# Patient Record
Sex: FEMALE | Race: WHITE | NOT HISPANIC OR LATINO | Employment: FULL TIME | URBAN - METROPOLITAN AREA
[De-identification: names, ages, dates, MRNs, and addresses within clinical notes are randomized per-mention and may not be internally consistent; named-entity substitution may affect disease eponyms.]

---

## 2021-08-25 ENCOUNTER — ANCILLARY PROCEDURE (OUTPATIENT)
Dept: GENERAL RADIOLOGY | Facility: CLINIC | Age: 21
End: 2021-08-25
Attending: PHYSICIAN ASSISTANT
Payer: COMMERCIAL

## 2021-08-25 ENCOUNTER — OFFICE VISIT (OUTPATIENT)
Dept: URGENT CARE | Facility: URGENT CARE | Age: 21
End: 2021-08-25
Payer: COMMERCIAL

## 2021-08-25 VITALS
OXYGEN SATURATION: 100 % | BODY MASS INDEX: 28.04 KG/M2 | RESPIRATION RATE: 16 BRPM | TEMPERATURE: 98.2 F | HEART RATE: 82 BPM | WEIGHT: 185 LBS | DIASTOLIC BLOOD PRESSURE: 71 MMHG | HEIGHT: 68 IN | SYSTOLIC BLOOD PRESSURE: 124 MMHG

## 2021-08-25 DIAGNOSIS — N76.0 BACTERIAL VAGINOSIS: Primary | ICD-10-CM

## 2021-08-25 DIAGNOSIS — R10.31 RIGHT LOWER QUADRANT PAIN: ICD-10-CM

## 2021-08-25 DIAGNOSIS — B96.89 BACTERIAL VAGINOSIS: Primary | ICD-10-CM

## 2021-08-25 DIAGNOSIS — R52 PAIN: ICD-10-CM

## 2021-08-25 LAB
ALBUMIN UR-MCNC: NEGATIVE MG/DL
ANION GAP SERPL CALCULATED.3IONS-SCNC: 2 MMOL/L (ref 3–14)
APPEARANCE UR: CLEAR
BACTERIA #/AREA URNS HPF: ABNORMAL /HPF
BASOPHILS # BLD AUTO: 0 10E3/UL (ref 0–0.2)
BASOPHILS NFR BLD AUTO: 0 %
BILIRUB UR QL STRIP: NEGATIVE
BUN SERPL-MCNC: 8 MG/DL (ref 7–30)
CALCIUM SERPL-MCNC: 8.7 MG/DL (ref 8.5–10.1)
CHLORIDE BLD-SCNC: 106 MMOL/L (ref 94–109)
CLUE CELLS: PRESENT
CO2 SERPL-SCNC: 28 MMOL/L (ref 20–32)
COLOR UR AUTO: YELLOW
CREAT SERPL-MCNC: 0.76 MG/DL (ref 0.52–1.04)
EOSINOPHIL # BLD AUTO: 0 10E3/UL (ref 0–0.7)
EOSINOPHIL NFR BLD AUTO: 0 %
ERYTHROCYTE [DISTWIDTH] IN BLOOD BY AUTOMATED COUNT: 12.1 % (ref 10–15)
GFR SERPL CREATININE-BSD FRML MDRD: >90 ML/MIN/1.73M2
GLUCOSE BLD-MCNC: 98 MG/DL (ref 70–99)
GLUCOSE UR STRIP-MCNC: NEGATIVE MG/DL
HCT VFR BLD AUTO: 37 % (ref 35–47)
HGB BLD-MCNC: 12.3 G/DL (ref 11.7–15.7)
HGB UR QL STRIP: ABNORMAL
KETONES UR STRIP-MCNC: NEGATIVE MG/DL
LEUKOCYTE ESTERASE UR QL STRIP: NEGATIVE
LYMPHOCYTES # BLD AUTO: 1.2 10E3/UL (ref 0.8–5.3)
LYMPHOCYTES NFR BLD AUTO: 9 %
MCH RBC QN AUTO: 29.9 PG (ref 26.5–33)
MCHC RBC AUTO-ENTMCNC: 33.2 G/DL (ref 31.5–36.5)
MCV RBC AUTO: 90 FL (ref 78–100)
MONOCYTES # BLD AUTO: 0.5 10E3/UL (ref 0–1.3)
MONOCYTES NFR BLD AUTO: 4 %
NEUTROPHILS # BLD AUTO: 11.5 10E3/UL (ref 1.6–8.3)
NEUTROPHILS NFR BLD AUTO: 87 %
NITRATE UR QL: NEGATIVE
PH UR STRIP: 6.5 [PH] (ref 5–7)
PLATELET # BLD AUTO: 273 10E3/UL (ref 150–450)
POTASSIUM BLD-SCNC: 3.4 MMOL/L (ref 3.4–5.3)
RBC # BLD AUTO: 4.11 10E6/UL (ref 3.8–5.2)
RBC #/AREA URNS AUTO: ABNORMAL /HPF
SODIUM SERPL-SCNC: 136 MMOL/L (ref 133–144)
SP GR UR STRIP: 1.02 (ref 1–1.03)
SQUAMOUS #/AREA URNS AUTO: ABNORMAL /LPF
TRICHOMONAS, WET PREP: ABNORMAL
UROBILINOGEN UR STRIP-ACNC: 0.2 E.U./DL
WBC # BLD AUTO: 13.2 10E3/UL (ref 4–11)
WBC #/AREA URNS AUTO: ABNORMAL /HPF
WBC'S/HIGH POWER FIELD, WET PREP: ABNORMAL
YEAST, WET PREP: ABNORMAL

## 2021-08-25 PROCEDURE — 99204 OFFICE O/P NEW MOD 45 MIN: CPT | Performed by: PHYSICIAN ASSISTANT

## 2021-08-25 PROCEDURE — 36415 COLL VENOUS BLD VENIPUNCTURE: CPT | Performed by: PHYSICIAN ASSISTANT

## 2021-08-25 PROCEDURE — 85025 COMPLETE CBC W/AUTO DIFF WBC: CPT | Performed by: PHYSICIAN ASSISTANT

## 2021-08-25 PROCEDURE — 80048 BASIC METABOLIC PNL TOTAL CA: CPT | Performed by: PHYSICIAN ASSISTANT

## 2021-08-25 PROCEDURE — 74019 RADEX ABDOMEN 2 VIEWS: CPT | Performed by: RADIOLOGY

## 2021-08-25 PROCEDURE — 87210 SMEAR WET MOUNT SALINE/INK: CPT | Performed by: PHYSICIAN ASSISTANT

## 2021-08-25 PROCEDURE — 81001 URINALYSIS AUTO W/SCOPE: CPT | Performed by: PHYSICIAN ASSISTANT

## 2021-08-25 RX ORDER — TRAMADOL HYDROCHLORIDE 50 MG/1
50 TABLET ORAL EVERY 6 HOURS PRN
Qty: 10 TABLET | Refills: 0 | Status: CANCELLED | OUTPATIENT
Start: 2021-08-25 | End: 2021-08-28

## 2021-08-25 RX ORDER — METRONIDAZOLE 500 MG/1
500 TABLET ORAL 2 TIMES DAILY
Qty: 14 TABLET | Refills: 0 | Status: SHIPPED | OUTPATIENT
Start: 2021-08-25 | End: 2021-09-01

## 2021-08-25 RX ORDER — IBUPROFEN 400 MG/1
500 TABLET, FILM COATED ORAL
COMMUNITY

## 2021-08-25 RX ORDER — CETIRIZINE HYDROCHLORIDE 10 MG/1
10 TABLET ORAL
COMMUNITY

## 2021-08-25 ASSESSMENT — MIFFLIN-ST. JEOR: SCORE: 1642.65

## 2021-08-25 NOTE — PROGRESS NOTES
Patient presents with:  Urgent Care: lower right abdominal pain since today     (N76.0,  B96.89) Bacterial vaginosis  (primary encounter diagnosis)  Comment:   Plan: metroNIDAZOLE (FLAGYL) 500 MG tablet            (R52) Pain  Comment:   Plan: UA macro with reflex to Microscopic and Culture        - Clinc Collect, Urine Microscopic            (R10.31) Right lower quadrant pain  Comment: likely secondary to the bacterial vaginosis  Plan: Wet prep - Clinic Collect, CBC with platelets         and differential, Basic metabolic panel  (Ca,         Cl, CO2, Creat, Gluc, K, Na, BUN), XR Abdomen 2        Views  TO ED should symptoms worsen or persist in ANY WAY      White blood cell count mildly elevated, suspect secondary to BV and discomfort, as patient has no other systemic symptoms.  Nonetheless, patient is to seek care in ED should symptoms persist or worsen in ANY way        SUBJECTIVE:   Krista Light is a 21 year old female who presents today with right sided lower abdominal pain today.  Pain comes and goes.  Last BM was today and did not affect pain level.    Denies any dysuria or fevers.  Denies any nausea or vomiting.      Had an IUD placed on 8/13/21, LMP 8/12/21    No past medical history on file.      Current Outpatient Medications   Medication Sig Dispense Refill     Multiple Vitamins-Iron (DAILY-ROLANDA/IRON/BETA-CAROTENE) TABS TAKE 1 TABLET BY MOUTH DAILY. (Patient not taking: Reported on 10/19/2020) 30 tablet 7     Social History     Tobacco Use     Smoking status: Never Smoker     Smokeless tobacco: Never Used   Substance Use Topics     Alcohol use: Not on file     Family History   Problem Relation Age of Onset     Diabetes Mother      Diabetes Father          ROS:    10 point ROS of systems including Constitutional, Eyes, Respiratory, Cardiovascular, Gastroenterology, Genitourinary, Integumentary, Muscularskeletal, Psychiatric ,neurological were all negative except for pertinent positives noted in my HPI  "      OBJECTIVE:  /71   Pulse 82   Temp 98.2  F (36.8  C)   Resp 16   Ht 1.727 m (5' 8\")   Wt 83.9 kg (185 lb)   LMP 08/11/2021   SpO2 100%   BMI 28.13 kg/m    Physical Exam:  GENERAL APPEARANCE: healthy, alert and no distress  ABDOMEN:  soft, nontender, no HSM or masses and bowel sounds normal.  NO tenderness on exam  BACK: no CVAT  GU_female: deferred, self wet prep obtained  SKIN: no suspicious lesions or rashes    X-Ray was done, my findings are: no stones or evidence of bowel obstruction.  IUD visualized in pelvis      "

## 2021-08-26 NOTE — PATIENT INSTRUCTIONS
(N76.0,  B96.89) Bacterial vaginosis  (primary encounter diagnosis)  Comment:   Plan: metroNIDAZOLE (FLAGYL) 500 MG tablet            (R52) Pain  Comment:   Plan: UA macro with reflex to Microscopic and Culture        - Clinc Collect, Urine Microscopic            (R10.31) Right lower quadrant pain  Comment: likely secondary to the bacterial vaginosis  Plan: Wet prep - Clinic Collect, CBC with platelets         and differential, Basic metabolic panel  (Ca,         Cl, CO2, Creat, Gluc, K, Na, BUN), XR Abdomen 2        Views  TO ED should symptoms worsen or persist in ANY WAY                Patient Education     Bacterial Vaginosis    You have a vaginal infection called bacterial vaginosis (BV). Both good and bad bacteria are present in a healthy vagina. BV occurs when these bacteria get out of balance. The number of bad bacteria increase. And the number of good bacteria decrease. BV is linked with sexual activity, but it's not a sexually transmitted infection (STI).   BV may or may not cause symptoms. If symptoms do occur, they can include:     Thin, gray, milky-white, or sometimes green discharge    Unpleasant odor or  fishy  smell    Itching, burning, or pain in or around the vagina  It is not known what causes BV, but certain factors can make the problem more likely. These can include:     Douching    Spermicides    Use of antibiotics    Change in hormone levels with pregnancy, breastfeeding, or menopause    Having sex with a new partner    Having sex with more than one partner  BV will sometimes go away on its own. But treatment is often advised. This is because untreated BV can raise the risk of more serious health problems such as:     Pelvic inflammatory disease (PID)     delivery (giving birth to a baby early if you re pregnant)    HIV and some other sexually transmitted infections (STIs)    Infection after surgery on the reproductive organs  Home care  General care    BV is most often treated with  medicines called antibiotics. These may be given as pills or as a vaginal cream. If antibiotics are prescribed, be sure to use them exactly as directed. And complete all of the medicine, even if your symptoms go away.    Don't douche or having sex during treatment.    If you have sex with a female partner, ask your healthcare provider if she should also be treated.  Prevention    Don't douche.    Don't have sex. If you do have sex, then take steps to lower your risk:  ? Use condoms when having sex.  ? Limit the number of sex partners you have.    Follow-up care  Follow up with your healthcare provider, or as advised.   When to get medical advice  Call your healthcare provider right away if:     You have a fever of 100.4 F (38 C) or higher, or as directed by your provider.    Your symptoms get worse, or they don t go away within a few days of starting treatment.    You have new pain in the lower belly or pelvic region.    You have side effects that bother you or a reaction to the pills or cream you re prescribed.    You or any of your sex partners have new symptoms, such as a rash, joint pain, or sores.  BER[employer] last reviewed this educational content on 6/1/2020 2000-2021 [employer]. All rights reserved. This information is not intended as a substitute for professional medical care. Always follow your healthcare professional's instructions.

## 2021-09-01 ENCOUNTER — OFFICE VISIT (OUTPATIENT)
Dept: INTERNAL MEDICINE | Facility: CLINIC | Age: 21
End: 2021-09-01
Payer: COMMERCIAL

## 2021-09-01 VITALS
OXYGEN SATURATION: 100 % | HEART RATE: 65 BPM | SYSTOLIC BLOOD PRESSURE: 116 MMHG | WEIGHT: 177.4 LBS | RESPIRATION RATE: 18 BRPM | BODY MASS INDEX: 26.97 KG/M2 | DIASTOLIC BLOOD PRESSURE: 68 MMHG

## 2021-09-01 DIAGNOSIS — R10.31 RLQ ABDOMINAL PAIN: Primary | ICD-10-CM

## 2021-09-01 DIAGNOSIS — R10.2 PELVIC PAIN IN FEMALE: ICD-10-CM

## 2021-09-01 PROCEDURE — 99213 OFFICE O/P EST LOW 20 MIN: CPT | Performed by: PHYSICIAN ASSISTANT

## 2021-09-01 NOTE — PROGRESS NOTES
"    Assessment & Plan     RLQ abdominal pain  Improving now only pressure    Pelvic pain in female  Right sided,  Possible related to menstrual cycle. IUD new x 1 month- had menses and had IUD placement done then has been spotting since.                  BMI:   Estimated body mass index is 26.97 kg/m  as calculated from the following:    Height as of 8/25/21: 1.727 m (5' 8\").    Weight as of this encounter: 80.5 kg (177 lb 6.4 oz).       Monitor symptoms if return of severe pain, fever abnormal discharge etc then recheck with primary  Ok to use ibuprofen as needed for pain ( this did help)       No follow-ups on file.    Jocelynn Fletcher PA-C  [facility]    Reggie Velasco is a 21 year old who presents for the following health issues     HPI     ED/UC Followup:    Facility:  [facility]  Date of visit: 08/25/2021  Reason for visit: BV, right side abdominal pain  Current Status: Mostly gone     Sharp right sided abdominal pain last week. Seen in UC, labs xray and wet prep done.  + BV only mildly elevated WBC, exam benign with no tenderness on exam  Xray with moderate stool. UA negative for infection + blood ( spotting menses)     Today feeling better. Mild discomfort pressure feeling right lower side  No sharp pain no fever. Stools every few days. - some hx of possible IBS with other provider doing a workup           Review of Systems   Constitutional, HEENT, cardiovascular, pulmonary, gi and gu systems are negative, except as otherwise noted.      Objective    /68   Pulse 65   Resp 18   Wt 80.5 kg (177 lb 6.4 oz)   LMP 08/11/2021   SpO2 100%   BMI 26.97 kg/m    Body mass index is 26.97 kg/m .  Physical Exam   GENERAL: healthy, alert and no distress  ABDOMEN: soft, nontender, no hepatosplenomegaly, no masses and bowel sounds normal  MS: no gross musculoskeletal defects noted, no edema  SKIN: no suspicious lesions or rashes    Office Visit on 08/25/2021 "   Component Date Value Ref Range Status     Color Urine 08/25/2021 Yellow  Colorless, Straw, Light Yellow, Yellow Final     Appearance Urine 08/25/2021 Clear  Clear Final     Glucose Urine 08/25/2021 Negative  Negative mg/dL Final     Bilirubin Urine 08/25/2021 Negative  Negative Final     Ketones Urine 08/25/2021 Negative  Negative mg/dL Final     Specific Gravity Urine 08/25/2021 1.025  1.003 - 1.035 Final     Blood Urine 08/25/2021 Large* Negative Final     pH Urine 08/25/2021 6.5  5.0 - 7.0 Final     Protein Albumin Urine 08/25/2021 Negative  Negative mg/dL Final     Urobilinogen Urine 08/25/2021 0.2  0.2, 1.0 E.U./dL Final     Nitrite Urine 08/25/2021 Negative  Negative Final     Leukocyte Esterase Urine 08/25/2021 Negative  Negative Final     Bacteria Urine 08/25/2021 None Seen  None Seen /HPF Final     RBC Urine 08/25/2021 10-25* 0-2 /HPF /HPF Final     WBC Urine 08/25/2021 0-5  0-5 /HPF /HPF Final     Squamous Epithelials Urine 08/25/2021 Few* None Seen /LPF Final     Trichomonas 08/25/2021 Absent  Absent Final     Yeast 08/25/2021 Absent  Absent Final     Clue Cells 08/25/2021 Present* Absent Final     WBCs/high power field 08/25/2021 2+* None Final     Sodium 08/25/2021 136  133 - 144 mmol/L Final     Potassium 08/25/2021 3.4  3.4 - 5.3 mmol/L Final     Chloride 08/25/2021 106  94 - 109 mmol/L Final     Carbon Dioxide (CO2) 08/25/2021 28  20 - 32 mmol/L Final     Anion Gap 08/25/2021 2* 3 - 14 mmol/L Final     Urea Nitrogen 08/25/2021 8  7 - 30 mg/dL Final     Creatinine 08/25/2021 0.76  0.52 - 1.04 mg/dL Final     Calcium 08/25/2021 8.7  8.5 - 10.1 mg/dL Final     Glucose 08/25/2021 98  70 - 99 mg/dL Final     GFR Estimate 08/25/2021 >90  >60 mL/min/1.73m2 Final    As of July 11, 2021, eGFR is calculated by the CKD-EPI creatinine equation, without race adjustment. eGFR can be influenced by muscle mass, exercise, and diet. The reported eGFR is an estimation only and is only applicable if the renal function  is stable.     WBC Count 08/25/2021 13.2* 4.0 - 11.0 10e3/uL Final     RBC Count 08/25/2021 4.11  3.80 - 5.20 10e6/uL Final     Hemoglobin 08/25/2021 12.3  11.7 - 15.7 g/dL Final     Hematocrit 08/25/2021 37.0  35.0 - 47.0 % Final     MCV 08/25/2021 90  78 - 100 fL Final     MCH 08/25/2021 29.9  26.5 - 33.0 pg Final     MCHC 08/25/2021 33.2  31.5 - 36.5 g/dL Final     RDW 08/25/2021 12.1  10.0 - 15.0 % Final     Platelet Count 08/25/2021 273  150 - 450 10e3/uL Final     % Neutrophils 08/25/2021 87  % Final     % Lymphocytes 08/25/2021 9  % Final     % Monocytes 08/25/2021 4  % Final     % Eosinophils 08/25/2021 0  % Final     % Basophils 08/25/2021 0  % Final     Absolute Neutrophils 08/25/2021 11.5* 1.6 - 8.3 10e3/uL Final     Absolute Lymphocytes 08/25/2021 1.2  0.8 - 5.3 10e3/uL Final     Absolute Monocytes 08/25/2021 0.5  0.0 - 1.3 10e3/uL Final     Absolute Eosinophils 08/25/2021 0.0  0.0 - 0.7 10e3/uL Final     Absolute Basophils 08/25/2021 0.0  0.0 - 0.2 10e3/uL Final

## 2021-10-17 ENCOUNTER — HEALTH MAINTENANCE LETTER (OUTPATIENT)
Age: 21
End: 2021-10-17

## 2022-10-03 ENCOUNTER — HEALTH MAINTENANCE LETTER (OUTPATIENT)
Age: 22
End: 2022-10-03

## 2023-02-11 ENCOUNTER — HEALTH MAINTENANCE LETTER (OUTPATIENT)
Age: 23
End: 2023-02-11

## 2024-03-09 ENCOUNTER — HEALTH MAINTENANCE LETTER (OUTPATIENT)
Age: 24
End: 2024-03-09

## 2025-02-13 ENCOUNTER — OFFICE VISIT (OUTPATIENT)
Dept: URGENT CARE | Facility: URGENT CARE | Age: 25
End: 2025-02-13
Payer: COMMERCIAL

## 2025-02-13 VITALS
RESPIRATION RATE: 19 BRPM | HEART RATE: 81 BPM | BODY MASS INDEX: 27.37 KG/M2 | DIASTOLIC BLOOD PRESSURE: 73 MMHG | WEIGHT: 180 LBS | SYSTOLIC BLOOD PRESSURE: 113 MMHG | OXYGEN SATURATION: 98 % | TEMPERATURE: 98.6 F

## 2025-02-13 DIAGNOSIS — R07.0 THROAT PAIN: ICD-10-CM

## 2025-02-13 DIAGNOSIS — J02.9 VIRAL PHARYNGITIS: Primary | ICD-10-CM

## 2025-02-13 LAB
DEPRECATED S PYO AG THROAT QL EIA: NEGATIVE
S PYO DNA THROAT QL NAA+PROBE: NOT DETECTED

## 2025-02-13 NOTE — PROGRESS NOTES
ICD-10-CM    1. Viral pharyngitis  J02.9       2. Throat pain  R07.0 Streptococcus A Rapid Screen w/Reflex to PCR - Clinic Collect     Group A Streptococcus PCR Throat Swab      Rest.  Fluids.  Salt water gargles, Tylenol or ibuprofen as needed for fever or pain.  Recheck in 10 days if symptoms have not improved, sooner if they worsen.  Await results of PCR testing for strep    Red flag warning signs and when to go to the emergency room discussed.  Reviewed potential adverse reactions to medications.    Labs:  Results for orders placed or performed in visit on 02/13/25 (from the past 24 hours)   Streptococcus A Rapid Screen w/Reflex to PCR - Clinic Collect    Specimen: Throat; Swab   Result Value Ref Range    Group A Strep antigen Negative Negative       SUBJECTIVE:   Krista Light is a 25 year old female presenting with a chief complaint of   Chief Complaint   Patient presents with    Pharyngitis     Sore throat for the last few days.    Sick for a week with sore throat    Review of systems is negative except for as noted in the HPI.    OBJECTIVE  /73 (BP Location: Right arm, Patient Position: Sitting, Cuff Size: Adult Large)   Pulse 81   Temp 98.6  F (37  C) (Oral)   Resp 19   Wt 81.6 kg (180 lb)   SpO2 98%   BMI 27.37 kg/m        GENERAL: Alert, mild distress  SKIN: skin is clear, no rash or abnormal pigmentation  HEAD: The head is normocephalic.   EYES: The eyes are normal. The conjunctivae and cornea normal.   NECK: The neck is supple and thyroid is normal, no masses; LYMPH NODES: No adenopathy  HENT: Bilateral tympanic membranes and canals appear normal, nasal passages are clear, pharynx has mild erythema but no tonsillar hypertrophy  LUNGS: The lung fields are clear to auscultation, no rales, rhonchi, wheezing or retractions  CV: Rhythm is regular. S1 and S2 are normal. No murmurs.  EXTREMITIES: Symmetric extremities no deformities    Karen Arredondo APRN, CNP  [facility]  Provider    The use of [employer] dictation services may have been used to construct the content in this note; any grammatical or spelling errors are non-intentional. Please contact the author of this note directly if you are in need of any clarification.

## 2025-03-16 ENCOUNTER — HEALTH MAINTENANCE LETTER (OUTPATIENT)
Age: 25
End: 2025-03-16